# Patient Record
Sex: FEMALE | Race: BLACK OR AFRICAN AMERICAN
[De-identification: names, ages, dates, MRNs, and addresses within clinical notes are randomized per-mention and may not be internally consistent; named-entity substitution may affect disease eponyms.]

---

## 2020-04-28 ENCOUNTER — HOSPITAL ENCOUNTER (EMERGENCY)
Dept: HOSPITAL 1 - ED | Age: 30
Discharge: HOME | End: 2020-04-28
Payer: MEDICAID

## 2020-04-28 VITALS
WEIGHT: 96 LBS | BODY MASS INDEX: 18.85 KG/M2 | HEIGHT: 60 IN | BODY MASS INDEX: 18.85 KG/M2 | HEIGHT: 60 IN | WEIGHT: 96 LBS

## 2020-04-28 VITALS — DIASTOLIC BLOOD PRESSURE: 62 MMHG | SYSTOLIC BLOOD PRESSURE: 110 MMHG

## 2020-04-28 DIAGNOSIS — D64.9: ICD-10-CM

## 2020-04-28 DIAGNOSIS — N93.9: ICD-10-CM

## 2020-04-28 DIAGNOSIS — Z3A.01: ICD-10-CM

## 2020-04-28 DIAGNOSIS — O23.41: Primary | ICD-10-CM

## 2020-04-28 LAB
ALBUMIN SERPL-MCNC: 4.2 G/DL (ref 3.4–5)
ALP SERPL-CCNC: 37 U/L (ref 46–116)
ALT SERPL-CCNC: 16 U/L (ref 14–59)
AST SERPL-CCNC: 21 U/L (ref 15–37)
BASOPHILS NFR BLD: 0 % (ref 0–2)
BILIRUB SERPL-MCNC: 0.31 MG/DL (ref 0.2–1)
BUN SERPL-MCNC: 13 MG/DL (ref 7–18)
CALCIUM SERPL-MCNC: 9.5 MG/DL (ref 8.5–10.1)
CHLORIDE SERPL-SCNC: 98 MMOL/L (ref 98–107)
CO2 SERPL-SCNC: 23.9 MMOL/L (ref 21–32)
CREAT SERPL-MCNC: 0.6 MG/DL (ref 0.6–1)
DACRYOCYTES BLD QL SMEAR: (no result)
ERYTHROCYTE [DISTWIDTH] IN BLOOD BY AUTOMATED COUNT: 20.1 % (ref 11.5–14.5)
GFR SERPLBLD BASED ON 1.73 SQ M-ARVRAT: > 60 ML/MIN
GLUCOSE SERPL-MCNC: 74 MG/DL (ref 74–106)
LIPASE SERPL-CCNC: 79 IU/L (ref 73–393)
MICROSCOPIC UR-IMP: YES
MONOCYTES NFR BLD: 7 % (ref 0–7)
NEUTS BAND NFR BLD: 1 % (ref 0–10)
NEUTS SEG NFR BLD MANUAL: 81 % (ref 37–75)
OVALOCYTES BLD QL SMEAR: (no result)
PLAT MORPH BLD: (no result)
PLATELET # BLD: 419 X10^3MCL (ref 130–400)
POTASSIUM SERPL-SCNC: 3.5 MMOL/L (ref 3.5–5.1)
PROT SERPL-MCNC: 7.8 G/DL (ref 6.4–8.2)
RBC # UR STRIP.AUTO: (no result) /UL
RBC MORPH BLD: (no result)
SODIUM SERPL-SCNC: 133 MMOL/L (ref 136–145)
UA SPECIFIC GRAVITY: >=1.03 (ref 1–1.03)

## 2022-07-02 ENCOUNTER — HOSPITAL ENCOUNTER (EMERGENCY)
Dept: HOSPITAL 26 - MED | Age: 32
Discharge: HOME | End: 2022-07-02
Payer: MEDICAID

## 2022-07-02 VITALS — SYSTOLIC BLOOD PRESSURE: 104 MMHG | DIASTOLIC BLOOD PRESSURE: 58 MMHG

## 2022-07-02 VITALS — WEIGHT: 106 LBS | BODY MASS INDEX: 20.81 KG/M2 | HEIGHT: 60 IN

## 2022-07-02 VITALS — SYSTOLIC BLOOD PRESSURE: 110 MMHG | DIASTOLIC BLOOD PRESSURE: 76 MMHG

## 2022-07-02 DIAGNOSIS — M54.50: Primary | ICD-10-CM

## 2022-07-02 DIAGNOSIS — Z98.890: ICD-10-CM

## 2022-07-02 DIAGNOSIS — Z88.1: ICD-10-CM

## 2022-07-02 DIAGNOSIS — Z79.891: ICD-10-CM

## 2022-07-02 DIAGNOSIS — R10.2: ICD-10-CM

## 2022-07-02 LAB
ANION GAP SERPL CALCULATED.3IONS-SCNC: 15.4 MMOL/L (ref 8–16)
APPEARANCE UR: CLEAR
BARBITURATES UR QL SCN: NEGATIVE NG/ML
BASOPHILS # BLD AUTO: 0 K/UL (ref 0–0.22)
BASOPHILS NFR BLD AUTO: 0.2 % (ref 0–2)
BENZODIAZ UR QL SCN: NEGATIVE NG/ML
BILIRUB UR QL STRIP: (no result)
BUN SERPL-MCNC: 7 MG/DL (ref 7–18)
BZE UR QL SCN: NEGATIVE NG/ML
CANNABINOIDS UR QL SCN: POSITIVE NG/ML
CHLORIDE SERPL-SCNC: 103 MMOL/L (ref 98–107)
CO2 SERPL-SCNC: 21.3 MMOL/L (ref 21–32)
COLOR UR: YELLOW
CREAT SERPL-MCNC: 0.7 MG/DL (ref 0.6–1.3)
EOSINOPHIL # BLD AUTO: 0 K/UL (ref 0–0.4)
EOSINOPHIL NFR BLD AUTO: 0.3 % (ref 0–4)
ERYTHROCYTE [DISTWIDTH] IN BLOOD BY AUTOMATED COUNT: 15.3 % (ref 11.6–13.7)
GFR SERPL CREATININE-BSD FRML MDRD: 126 ML/MIN (ref 90–?)
GLUCOSE SERPL-MCNC: 78 MG/DL (ref 74–106)
GLUCOSE UR STRIP-MCNC: NEGATIVE MG/DL
HCT VFR BLD AUTO: 35.3 % (ref 36–48)
HGB BLD-MCNC: 11.6 G/DL (ref 12–16)
HGB UR QL STRIP: (no result)
LEUKOCYTE ESTERASE UR QL STRIP: NEGATIVE
LYMPHOCYTES # BLD AUTO: 0.3 K/UL (ref 2.5–16.5)
LYMPHOCYTES NFR BLD AUTO: 2.7 % (ref 20.5–51.1)
MCH RBC QN AUTO: 29 PG (ref 27–31)
MCHC RBC AUTO-ENTMCNC: 33 G/DL (ref 33–37)
MCV RBC AUTO: 89.4 FL (ref 80–94)
MONOCYTES # BLD AUTO: 0.8 K/UL (ref 0.8–1)
MONOCYTES NFR BLD AUTO: 8 % (ref 1.7–9.3)
NEUTROPHILS # BLD AUTO: 8.4 K/UL (ref 1.8–7.7)
NEUTROPHILS NFR BLD AUTO: 88.8 % (ref 42.2–75.2)
NITRITE UR QL STRIP: NEGATIVE
OPIATES UR QL SCN: NEGATIVE NG/ML
PCP UR QL SCN: NEGATIVE NG/ML
PH UR STRIP: 6 [PH] (ref 5–9)
PLATELET # BLD AUTO: 188 K/UL (ref 140–450)
POTASSIUM SERPL-SCNC: 3.7 MMOL/L (ref 3.5–5.1)
RBC # BLD AUTO: 3.95 MIL/UL (ref 4.2–5.4)
RBC #/AREA URNS HPF: (no result) /HPF (ref 0–5)
SODIUM SERPL-SCNC: 136 MMOL/L (ref 136–145)
WBC # BLD AUTO: 9.5 K/UL (ref 4.8–10.8)
WBC NRBC COR # BLD AUTO: 9.5 K/UL (ref 4.5–11)
WBC,URINE: 0 /HPF (ref 0–5)

## 2022-07-02 PROCEDURE — 81001 URINALYSIS AUTO W/SCOPE: CPT

## 2022-07-02 PROCEDURE — 96372 THER/PROPH/DIAG INJ SC/IM: CPT

## 2022-07-02 PROCEDURE — 99284 EMERGENCY DEPT VISIT MOD MDM: CPT

## 2022-07-02 PROCEDURE — 76856 US EXAM PELVIC COMPLETE: CPT

## 2022-07-02 PROCEDURE — 80305 DRUG TEST PRSMV DIR OPT OBS: CPT

## 2022-07-02 PROCEDURE — 36415 COLL VENOUS BLD VENIPUNCTURE: CPT

## 2022-07-02 PROCEDURE — 93976 VASCULAR STUDY: CPT

## 2022-07-02 PROCEDURE — 80048 BASIC METABOLIC PNL TOTAL CA: CPT

## 2022-07-02 PROCEDURE — 81025 URINE PREGNANCY TEST: CPT

## 2022-07-02 PROCEDURE — 85025 COMPLETE CBC W/AUTO DIFF WBC: CPT

## 2022-07-02 NOTE — NUR
33 Y/O FEMALE BIB SELF C/O OF LOWER BACK PAIN, ABD PAIN RADIATING TO THE 
BILATERAL LEGS 10/10 PRESSURE. DENIED ANY TRAUMA OR RECENT INJURY. DENIED ANY 
FEVER, N/V/D. DENIED ANY MEDICATION FOR PAIN



STATED THAT SHE HAD AN  IN JANUARY BUT THAT IT WASNT FULLY REMOVED. HX 
OF ECTOPIC PREGNANCY



NKA

PMH: DENIES

## 2022-07-02 NOTE — NUR
Patient discharged with v/s stable. Written and verbal after care instructions 
given and explained. 

Patient alert, oriented and verbalized understanding of instructions. 
Ambulatory with steady gait. All questions addressed prior to discharge. ID 
band removed. Patient advised to follow up with PMD. Rx of 
HYDROCODON-ACETAMINOPHEN 5-325 given. Patient educated on indication of 
medication including possible reaction and side effects. Opportunity to ask 
questions provided and answered.

## 2022-09-12 ENCOUNTER — HOSPITAL ENCOUNTER (EMERGENCY)
Dept: HOSPITAL 26 - MED | Age: 32
Discharge: HOME | End: 2022-09-12
Payer: MEDICAID

## 2022-09-12 VITALS — WEIGHT: 98 LBS | HEIGHT: 59 IN | BODY MASS INDEX: 19.76 KG/M2

## 2022-09-12 VITALS — DIASTOLIC BLOOD PRESSURE: 55 MMHG | SYSTOLIC BLOOD PRESSURE: 98 MMHG

## 2022-09-12 VITALS — DIASTOLIC BLOOD PRESSURE: 73 MMHG | SYSTOLIC BLOOD PRESSURE: 108 MMHG

## 2022-09-12 DIAGNOSIS — E87.6: ICD-10-CM

## 2022-09-12 DIAGNOSIS — Z98.890: ICD-10-CM

## 2022-09-12 DIAGNOSIS — Z79.899: ICD-10-CM

## 2022-09-12 DIAGNOSIS — Z79.891: ICD-10-CM

## 2022-09-12 DIAGNOSIS — O99.281: ICD-10-CM

## 2022-09-12 DIAGNOSIS — Z3A.01: ICD-10-CM

## 2022-09-12 DIAGNOSIS — E86.0: ICD-10-CM

## 2022-09-12 DIAGNOSIS — Z88.1: ICD-10-CM

## 2022-09-12 DIAGNOSIS — O21.8: Primary | ICD-10-CM

## 2022-09-12 LAB
ALBUMIN FLD-MCNC: 4.3 G/DL (ref 3.4–5)
ANION GAP SERPL CALCULATED.3IONS-SCNC: 15.7 MMOL/L (ref 8–16)
APPEARANCE UR: (no result)
AST SERPL-CCNC: 19 U/L (ref 15–37)
BASOPHILS # BLD AUTO: 0 K/UL (ref 0–0.22)
BASOPHILS NFR BLD AUTO: 0.2 % (ref 0–2)
BILIRUB SERPL-MCNC: 0.3 MG/DL (ref 0–1)
BILIRUB UR QL STRIP: NEGATIVE
BUN SERPL-MCNC: 10 MG/DL (ref 7–18)
CHLORIDE SERPL-SCNC: 101 MMOL/L (ref 98–107)
CO2 SERPL-SCNC: 22.6 MMOL/L (ref 21–32)
COLOR UR: YELLOW
CREAT SERPL-MCNC: 0.7 MG/DL (ref 0.6–1.3)
EOSINOPHIL # BLD AUTO: 0 K/UL (ref 0–0.4)
EOSINOPHIL NFR BLD AUTO: 0 % (ref 0–4)
ERYTHROCYTE [DISTWIDTH] IN BLOOD BY AUTOMATED COUNT: 14.2 % (ref 11.6–13.7)
GFR SERPL CREATININE-BSD FRML MDRD: 126 ML/MIN (ref 90–?)
GLUCOSE SERPL-MCNC: 78 MG/DL (ref 74–106)
GLUCOSE UR STRIP-MCNC: NEGATIVE MG/DL
HCT VFR BLD AUTO: 35.9 % (ref 36–48)
HGB BLD-MCNC: 11.8 G/DL (ref 12–16)
HGB UR QL STRIP: NEGATIVE
LEUKOCYTE ESTERASE UR QL STRIP: NEGATIVE
LYMPHOCYTES # BLD AUTO: 0.4 K/UL (ref 2.5–16.5)
LYMPHOCYTES NFR BLD AUTO: 5.5 % (ref 20.5–51.1)
MCH RBC QN AUTO: 28 PG (ref 27–31)
MCHC RBC AUTO-ENTMCNC: 33 G/DL (ref 33–37)
MCV RBC AUTO: 86.1 FL (ref 80–94)
MONOCYTES # BLD AUTO: 0.7 K/UL (ref 0.8–1)
MONOCYTES NFR BLD AUTO: 9.6 % (ref 1.7–9.3)
NEUTROPHILS # BLD AUTO: 6.3 K/UL (ref 1.8–7.7)
NEUTROPHILS NFR BLD AUTO: 84.7 % (ref 42.2–75.2)
NITRITE UR QL STRIP: NEGATIVE
PH UR STRIP: 6.5 [PH] (ref 5–9)
PLATELET # BLD AUTO: 260 K/UL (ref 140–450)
POTASSIUM SERPL-SCNC: 3.3 MMOL/L (ref 3.5–5.1)
RBC # BLD AUTO: 4.17 MIL/UL (ref 4.2–5.4)
SODIUM SERPL-SCNC: 136 MMOL/L (ref 136–145)
WBC # BLD AUTO: 7.4 K/UL (ref 4.8–10.8)

## 2022-09-12 PROCEDURE — 80053 COMPREHEN METABOLIC PANEL: CPT

## 2022-09-12 PROCEDURE — 81003 URINALYSIS AUTO W/O SCOPE: CPT

## 2022-09-12 PROCEDURE — 99284 EMERGENCY DEPT VISIT MOD MDM: CPT

## 2022-09-12 PROCEDURE — 96366 THER/PROPH/DIAG IV INF ADDON: CPT

## 2022-09-12 PROCEDURE — 36415 COLL VENOUS BLD VENIPUNCTURE: CPT

## 2022-09-12 PROCEDURE — 96376 TX/PRO/DX INJ SAME DRUG ADON: CPT

## 2022-09-12 PROCEDURE — 96375 TX/PRO/DX INJ NEW DRUG ADDON: CPT

## 2022-09-12 PROCEDURE — 84702 CHORIONIC GONADOTROPIN TEST: CPT

## 2022-09-12 PROCEDURE — 81025 URINE PREGNANCY TEST: CPT

## 2022-09-12 PROCEDURE — 76801 OB US < 14 WKS SINGLE FETUS: CPT

## 2022-09-12 PROCEDURE — 96365 THER/PROPH/DIAG IV INF INIT: CPT

## 2022-09-12 PROCEDURE — 85025 COMPLETE CBC W/AUTO DIFF WBC: CPT

## 2022-09-12 PROCEDURE — 83690 ASSAY OF LIPASE: CPT

## 2022-09-12 NOTE — NUR
Patient discharged with v/s stable. Written and verbal after care instructions 
given.

Patient alert, oriented and verbalized understanding of instructions. 
Ambulatory with steady gait. All questions addressed prior to discharge. ID 
band removed. Patient advised to follow up with PMD. Rx of Zofran given. 
Opportunity to ask questions provided and answered.

## 2022-09-12 NOTE — NUR
31 y/o female bib self with c/o n/v, abdominal pain and headache x 3 days. 
Patient states her son had similar symptoms but no vomiting. Patient has 9/10 
abdominal pain. Patient denies eating any new food. Denies any fever or chills. 
Patient states she is "having hot flashes." Patient states her bowel movements 
are normal, no diarrhea. LMP 9/2/22.



Medical History: Denies

ALLERGY: AZITHROMYCIN

## 2022-09-13 ENCOUNTER — HOSPITAL ENCOUNTER (INPATIENT)
Dept: HOSPITAL 26 - MED | Age: 32
LOS: 1 days | Discharge: LEFT BEFORE DISCHARGE | DRG: 566 | End: 2022-09-14
Attending: FAMILY MEDICINE | Admitting: FAMILY MEDICINE
Payer: MEDICAID

## 2022-09-13 VITALS — HEIGHT: 60 IN | WEIGHT: 97 LBS | BODY MASS INDEX: 19.04 KG/M2

## 2022-09-13 VITALS — DIASTOLIC BLOOD PRESSURE: 71 MMHG | SYSTOLIC BLOOD PRESSURE: 116 MMHG

## 2022-09-13 VITALS — DIASTOLIC BLOOD PRESSURE: 54 MMHG | SYSTOLIC BLOOD PRESSURE: 95 MMHG

## 2022-09-13 DIAGNOSIS — O21.0: Primary | ICD-10-CM

## 2022-09-13 DIAGNOSIS — E78.00: ICD-10-CM

## 2022-09-13 DIAGNOSIS — Z3A.01: ICD-10-CM

## 2022-09-13 DIAGNOSIS — E83.42: ICD-10-CM

## 2022-09-13 DIAGNOSIS — O99.281: ICD-10-CM

## 2022-09-13 DIAGNOSIS — D61.818: ICD-10-CM

## 2022-09-13 DIAGNOSIS — Z20.822: ICD-10-CM

## 2022-09-13 DIAGNOSIS — Z79.899: ICD-10-CM

## 2022-09-13 DIAGNOSIS — Z79.891: ICD-10-CM

## 2022-09-13 DIAGNOSIS — O26.51: ICD-10-CM

## 2022-09-13 LAB
ALBUMIN FLD-MCNC: 4.3 G/DL (ref 3.4–5)
AMYLASE SERPL-CCNC: 67 U/L (ref 25–115)
ANION GAP SERPL CALCULATED.3IONS-SCNC: 19.5 MMOL/L (ref 8–16)
AST SERPL-CCNC: 25 U/L (ref 15–37)
BASOPHILS # BLD AUTO: 0 K/UL (ref 0–0.22)
BASOPHILS NFR BLD AUTO: 0.3 % (ref 0–2)
BILIRUB SERPL-MCNC: 0.2 MG/DL (ref 0–1)
BUN SERPL-MCNC: 6 MG/DL (ref 7–18)
CHLORIDE SERPL-SCNC: 100 MMOL/L (ref 98–107)
CHOLEST/HDLC SERPL: 3.3 {RATIO} (ref 1–4.5)
CO2 SERPL-SCNC: 20.2 MMOL/L (ref 21–32)
CREAT SERPL-MCNC: 0.7 MG/DL (ref 0.6–1.3)
EOSINOPHIL # BLD AUTO: 0 K/UL (ref 0–0.4)
EOSINOPHIL NFR BLD AUTO: 0 % (ref 0–4)
ERYTHROCYTE [DISTWIDTH] IN BLOOD BY AUTOMATED COUNT: 14.1 % (ref 11.6–13.7)
GFR SERPL CREATININE-BSD FRML MDRD: 126 ML/MIN (ref 90–?)
GLUCOSE SERPL-MCNC: 67 MG/DL (ref 74–106)
HCT VFR BLD AUTO: 38.4 % (ref 36–48)
HDLC SERPL-MCNC: 66 MG/DL (ref 40–60)
HGB BLD-MCNC: 12.6 G/DL (ref 12–16)
LDLC SERPL CALC-MCNC: 129 MG/DL (ref 60–100)
LIPASE SERPL-CCNC: 52 U/L (ref 73–393)
LIPASE SERPL-CCNC: 60 U/L (ref 73–393)
LYMPHOCYTES # BLD AUTO: 0.8 K/UL (ref 2.5–16.5)
LYMPHOCYTES NFR BLD AUTO: 11.1 % (ref 20.5–51.1)
MAGNESIUM SERPL-MCNC: 1.7 MG/DL (ref 1.8–2.4)
MCH RBC QN AUTO: 29 PG (ref 27–31)
MCHC RBC AUTO-ENTMCNC: 33 G/DL (ref 33–37)
MCV RBC AUTO: 87.2 FL (ref 80–94)
MONOCYTES # BLD AUTO: 0.8 K/UL (ref 0.8–1)
MONOCYTES NFR BLD AUTO: 12.2 % (ref 1.7–9.3)
NEUTROPHILS # BLD AUTO: 5.3 K/UL (ref 1.8–7.7)
NEUTROPHILS NFR BLD AUTO: 76.4 % (ref 42.2–75.2)
PHOSPHATE SERPL-MCNC: 3.5 MG/DL (ref 2.5–4.9)
PLATELET # BLD AUTO: 246 K/UL (ref 140–450)
POTASSIUM SERPL-SCNC: 3.7 MMOL/L (ref 3.5–5.1)
PROTHROMBIN TIME: 10.4 SECS (ref 10.8–13.4)
RBC # BLD AUTO: 4.41 MIL/UL (ref 4.2–5.4)
SODIUM SERPL-SCNC: 136 MMOL/L (ref 136–145)
T4 FREE SERPL-MCNC: 1.03 NG/DL (ref 0.76–1.46)
TRIGL SERPL-MCNC: 107 MG/DL (ref 30–150)
TSH SERPL DL<=0.05 MIU/L-ACNC: 0.86 UIU/ML (ref 0.34–3.74)
WBC # BLD AUTO: 6.9 K/UL (ref 4.8–10.8)

## 2022-09-13 RX ADMIN — SODIUM CHLORIDE PRN MG: 9 INJECTION, SOLUTION INTRAVENOUS at 23:43

## 2022-09-13 RX ADMIN — Medication SCH TAB: at 23:55

## 2022-09-13 RX ADMIN — SODIUM CHLORIDE SCH MLS/HR: 9 INJECTION, SOLUTION INTRAVENOUS at 21:40

## 2022-09-13 NOTE — NUR
Admitted from ER, with chief complaint of NAUSEA AND VOMITING, 33 y/o ,Female, Cooperative, 
AWAKE, A/OX4. RESPIRATION EVEN AND UNLABORED. , WITH 1 . VERBALIZED SHE WAS 
SEEN IN ER YESTERDAY AND SENT HOME WITH ZOFRAN PO BUT STILL SHE IS NAUSEATED AND VOMITING. 
INDEPENDENT, ABLE TO AMBULATE BY HERSELF.  HEAD TO TOE ASSESSMENT DONE WITH ANTOINETTE POOLE. SKIN 
IS INTACT. DENIES PAIN 0/10.oriented to call light, bed, phone,television, bathroom, smoking 
policy,visiting hours, procedures, ID bracelet on. Belongings list checked.

## 2022-09-13 NOTE — NUR
Per Dr. Saunders's order for regular diet, patient given 2 apple juice and crackers 
to eat. Patient states she "does not feel nauseas," after given zofran. 
Patient's BG per lab was 67. Dr. Saunders aware.

## 2022-09-13 NOTE — NUR
Patient will be admitted to care of Jing CURRY. [facility]  Will go to 
room 104A. Belongings list completed.  Report to Jing CURRY. Medsurg nurse Jing CURRY 
verbalized understanding of report, no further questions.

## 2022-09-14 VITALS — SYSTOLIC BLOOD PRESSURE: 107 MMHG | DIASTOLIC BLOOD PRESSURE: 52 MMHG

## 2022-09-14 VITALS — SYSTOLIC BLOOD PRESSURE: 123 MMHG | DIASTOLIC BLOOD PRESSURE: 52 MMHG

## 2022-09-14 VITALS — DIASTOLIC BLOOD PRESSURE: 32 MMHG | SYSTOLIC BLOOD PRESSURE: 123 MMHG

## 2022-09-14 LAB
ANION GAP SERPL CALCULATED.3IONS-SCNC: 16.9 MMOL/L (ref 8–16)
APPEARANCE UR: CLEAR
BARBITURATES UR QL SCN: NEGATIVE NG/ML
BASOPHILS # BLD AUTO: 0 K/UL (ref 0–0.22)
BASOPHILS NFR BLD AUTO: 0.4 % (ref 0–2)
BENZODIAZ UR QL SCN: NEGATIVE NG/ML
BILIRUB UR QL STRIP: NEGATIVE
BUN SERPL-MCNC: 5 MG/DL (ref 7–18)
BZE UR QL SCN: NEGATIVE NG/ML
CANNABINOIDS UR QL SCN: POSITIVE NG/ML
CHLORIDE SERPL-SCNC: 105 MMOL/L (ref 98–107)
CO2 SERPL-SCNC: 17.6 MMOL/L (ref 21–32)
COLOR UR: YELLOW
CREAT SERPL-MCNC: 0.6 MG/DL (ref 0.6–1.3)
EOSINOPHIL # BLD AUTO: 0 K/UL (ref 0–0.4)
EOSINOPHIL NFR BLD AUTO: 0.1 % (ref 0–4)
ERYTHROCYTE [DISTWIDTH] IN BLOOD BY AUTOMATED COUNT: 13.8 % (ref 11.6–13.7)
GFR SERPL CREATININE-BSD FRML MDRD: 150 ML/MIN (ref 90–?)
GLUCOSE SERPL-MCNC: 61 MG/DL (ref 74–106)
GLUCOSE UR STRIP-MCNC: NEGATIVE MG/DL
HCT VFR BLD AUTO: 32 % (ref 36–48)
HGB BLD-MCNC: 10.6 G/DL (ref 12–16)
HGB UR QL STRIP: (no result)
LEUKOCYTE ESTERASE UR QL STRIP: NEGATIVE
LYMPHOCYTES # BLD AUTO: 0.9 K/UL (ref 2.5–16.5)
LYMPHOCYTES NFR BLD AUTO: 24.1 % (ref 20.5–51.1)
MCH RBC QN AUTO: 29 PG (ref 27–31)
MCHC RBC AUTO-ENTMCNC: 33 G/DL (ref 33–37)
MCV RBC AUTO: 86 FL (ref 80–94)
MONOCYTES # BLD AUTO: 0.6 K/UL (ref 0.8–1)
MONOCYTES NFR BLD AUTO: 16.6 % (ref 1.7–9.3)
NEUTROPHILS # BLD AUTO: 2.2 K/UL (ref 1.8–7.7)
NEUTROPHILS NFR BLD AUTO: 58.8 % (ref 42.2–75.2)
NITRITE UR QL STRIP: NEGATIVE
OPIATES UR QL SCN: NEGATIVE NG/ML
PCP UR QL SCN: NEGATIVE NG/ML
PH UR STRIP: 6 [PH] (ref 5–9)
PLATELET # BLD AUTO: 210 K/UL (ref 140–450)
POTASSIUM SERPL-SCNC: 3.5 MMOL/L (ref 3.5–5.1)
RBC # BLD AUTO: 3.72 MIL/UL (ref 4.2–5.4)
RBC #/AREA URNS HPF: (no result) /HPF (ref 0–5)
SODIUM SERPL-SCNC: 136 MMOL/L (ref 136–145)
WBC # BLD AUTO: 3.8 K/UL (ref 4.8–10.8)
WBC,URINE: (no result) /HPF (ref 0–5)

## 2022-09-14 RX ADMIN — SODIUM CHLORIDE SCH MLS/HR: 9 INJECTION, SOLUTION INTRAVENOUS at 10:17

## 2022-09-14 RX ADMIN — Medication SCH TAB: at 08:49

## 2022-09-14 RX ADMIN — SODIUM CHLORIDE PRN MG: 9 INJECTION, SOLUTION INTRAVENOUS at 12:46

## 2022-09-14 NOTE — NUR
PATIENT HAS BEEN SCREENED AND CATEGORIZED AS HIGH NUTRITION RISK. PATIENT WILL BE SEEN 
WITHIN 1-2 DAYS OF ADMISSION.



9/14/22-9/15/22



REVIEWED BY MARU SANDERS RD

## 2022-09-14 NOTE — NUR
9/14/22 RD INITIAL ASSESSMENT COMPLETED



PLEASE REFER TO NUTRITION ASSESSMENT UNDER CARE ACTIVITY FOR ESTIMATED NUTRITIONAL NEEDS. 



1. CONTINUE REGULAR DIET AS TOLERATED 

2. RD RECOMMENDS ENSURE TID FOR NUTRITIONAL SUPPORT. 

3. WILL MONITOR PO INTAKE AND GI SYMPTOMS 

4. RD TO FOLLOW-UP 7 DAYS, LOW RISK 



REVIEWED BY MARU SANDERS RD

## 2022-09-14 NOTE — NUR
DC PLANNING 



SW MET WITH PATIENT TO COMPLETE ASSESSMENT. PATIENT REPORT RESIDING WITH 

HER SON AT THE ADDRESS LISTED. PATIENT IDENTIFIED [facility]ILL[facility] PALMER (MOM) 

[phone] AS EMERGENCY CONTACT AND MDM. PATIENT DENIED HAVING AD IN 

PLACE HOWEVER DECLINED AD OFFERED BY SW. PATIENT REPORTS MEETING WITH PCP 

AS NEEDED, LAST VISIT; JAN 22'.  PATIENT REPORTS BEING NEWLY PREGNANT AND 

DENIES BARRIER IN ACCESS TO ADEQUATE FOOD SOURCE. PATIENT REPORTS 

RECEIVING [facility]UR[facility]ESH BENEFITS OF $219 AND CASH AID OF $676. PATIENT IS 

INDEPENDENT IN ALL ACTIVITIES AND DENIES USE OF DME. PATIENT DENIES TAKING 

MEDICATION AND DENIES BARRIERS IN ACCESSING MEDICATION IF NEEDED. PATIENT 

REPORTS PICKING UP MEDICATIONS FROM [facility] ON [facility] IN [place], WHEN 

NEEDED. PT DENIES MH/SA HX. PATIENT REPORTS ADEQUATE FAMILY SUPPORT AND REPORTS DC PLAN IS 
TO RETURN HOME WITH PARTNER PROVIDING TRANSPORTATION AND AIDING IN CARE, IF REQUIRED. SW 
INQUIRED ON ADDITIONAL RESOURCES NEEDED, PATIENT DECLINED AT THIS TIME.

## 2022-09-14 NOTE — NUR
PT IS DRINKING INSURE, CAN NOT EAT EARLY GIVEN ANTI NAUSEA AS ORDERED, WILL CONTINUE TO 
MONITOR. [id number]

## 2022-09-14 NOTE — NUR
DC PLANNIN YRS OLD FEMALE PATIENT WAS ADMITTED FROM HOME WITH A DX OF HYPEREMESIS GRAVIDARUM. US 
PREGNANCY SHOWED EARLY INTRAUTERINE PREGNANCY OF 6 WEEKS. RAPID COVID TEST NEGATIVE. 
ADMINISTERED IVF, ANTIHISTAMINE ZOFRAN AND PRENATAL VITAMIN. CONSULTED WITH OB GYN DR BURGOS. DC PLAN TO GO HOME WHEN STABLE CM TO FOLLOW

## 2022-09-14 NOTE — NUR
PATIENT HAS SIGN OUT AGAINST MEDICAL ADVISE FOR PERSONAL REASON. IV WAS DISCHARGED. PATIENT 
WAS GIVEN A BELONGING BAG AND WAS GIVEN EDUCATION TO RETURN TO THE ER IF HER CONDITION GET 
WORSE. SHE WAS ENCOURAGED TO CONTACT HE OB/GYN FOR A FOLLOW UP APPOINTMENT IMMEDIATELY. 
[id number]

## 2022-09-14 NOTE — NUR
CUP FOR URINE COLLECTION GIVEN,  INSTRUCTED TO CALL NURSE AFTER VOIDING TO BE ABLE TO SEND 
TO LAB FOR URINE TEST. VERBALIZED UNDERSTANDING.

## 2022-09-14 NOTE — NUR
PATIENT WAS ENDORSED BY MIGUEL CURRY, PATIENT WAS STABLE DURING SHIFT REPORT. PATIENT WAS 
INQUIRING ABOUT DISCHARGE AND NURSING WILL INFORM THE MD. PATIENT DENIES ANY 
PAIN/DISCOMFORT. NO NOTED S/S OF RESPIRATORY DISCHARGE. PATIENT DENIES AND FURTHER VAGINAL 
BLEEDING OR DISCHARGE AT THIS TIME. PATIENT DENIES ANY NAUSEA/VOMITING. BED AT THE LOWEST 
LEVEL. SIDE RAILS UP X 2 CALL LIGHT WITHIN REACH FOR ALL ASSISTANCE AND NEEDS. [id number]

## 2022-09-15 LAB
T3RU NFR SERPL: 26 % (ref 24–39)
T4 SERPL-MCNC: 7.6 UG/DL (ref 4.5–12)